# Patient Record
Sex: MALE
[De-identification: names, ages, dates, MRNs, and addresses within clinical notes are randomized per-mention and may not be internally consistent; named-entity substitution may affect disease eponyms.]

---

## 2017-10-30 ENCOUNTER — APPOINTMENT (OUTPATIENT)
Dept: OPHTHALMOLOGY | Facility: CLINIC | Age: 22
End: 2017-10-30
Payer: COMMERCIAL

## 2017-10-30 PROBLEM — Z00.00 ENCOUNTER FOR PREVENTIVE HEALTH EXAMINATION: Status: ACTIVE | Noted: 2017-10-30

## 2017-10-30 PROCEDURE — 92002 INTRM OPH EXAM NEW PATIENT: CPT

## 2017-10-30 RX ORDER — MOXIFLOXACIN OPHTHALMIC 5 MG/ML
0.5 SOLUTION/ DROPS OPHTHALMIC
Qty: 1 | Refills: 1 | Status: ACTIVE | COMMUNITY
Start: 2017-10-30 | End: 1900-01-01

## 2017-11-01 ENCOUNTER — APPOINTMENT (OUTPATIENT)
Dept: OPHTHALMOLOGY | Facility: CLINIC | Age: 22
End: 2017-11-01
Payer: COMMERCIAL

## 2017-11-01 DIAGNOSIS — H18.823 CORNEAL DISORDER DUE TO CONTACT LENS, BILATERAL: ICD-10-CM

## 2017-11-01 PROCEDURE — 92012 INTRM OPH EXAM EST PATIENT: CPT

## 2018-07-20 ENCOUNTER — HOSPITAL ENCOUNTER (EMERGENCY)
Dept: HOSPITAL 74 - JER | Age: 23
LOS: 1 days | Discharge: HOME | End: 2018-07-21
Payer: COMMERCIAL

## 2018-07-20 VITALS — BODY MASS INDEX: 25 KG/M2
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
## 2018-07-20 DIAGNOSIS — S01.112A: Primary | ICD-10-CM
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
## 2018-07-20 PROCEDURE — 0HQ1XZZ REPAIR FACE SKIN, EXTERNAL APPROACH: ICD-10-PCS

## 2018-07-20 NOTE — PDOC
History of Present Illness





- General


Stated Complaint: LACERATION


Time Seen by Provider: 07/20/18 23:41


History Source: Patient


Exam Limitations: No Limitations





- History of Present Illness


Initial Comments: 





07/21/18 00:11


23-year-old male presents to the ER complaining of a laceration to the left 

lateral eyebrow. Patient states while playing [place] soccer, he got elbowed in 

the left eyebrow. Bleeding controlled with direct pressure prior to his 

arrival. Patient denies headache, dizziness, lightheadedness, neck pain/

stiffness, back pains, visual disturbance, eye pain or diplopia. Patient denies 

any other complaints. Last tetanus 3 months ago.


07/21/18 00:15


Laceration repair by Dr. PATI Young





Past History





- Past Medical History


Allergies/Adverse Reactions: 


 Allergies











Allergy/AdvReac Type Severity Reaction Status Date / Time


 


No Known Allergies Allergy   Verified 07/21/18 00:09











Home Medications: 


Ambulatory Orders





NK [No Known Home Medication]  07/21/18 











**Review of Systems





- Review of Systems


Able to Perform ROS?: Yes


Comments:: 





07/20/18 23:42


CONSTITUTIONAL: 


Absent: fever, chills, diaphoresis, generalized weakness, malaise, loss of 

appetite


HEENT: 


+LEft lat eyebrow lac


Absent: rhinorrhea, nasal congestion, throat pain, throat swelling, difficulty 

swallowing, mouth swelling, ear pain, eye pain, visual Changes


CARDIOVASCULAR: 


Absent: chest pain, loss of consciousness, palpitations, irregular heart rate, 

peripheral edema


RESPIRATORY: 


Absent: cough, shortness of breath, dyspnea with exertion, orthopnea, wheezing, 

stridor, hemoptysis


GASTROINTESTINAL:


Absent: abdominal pain, abdominal distension, nausea, vomiting, diarrhea, 

constipation, melena, hematochezia


GENITOURINARY: 


Absent: dysuria, frequency, urgency, hesitancy, hematuria, flank pain, genital 

pain


MUSCULOSKELETAL: 


Absent: myalgia, arthralgia, joint swelling


SKIN: 


Absent: rash, itching, pallor


HEMATOLOGIC/IMMUNOLOGIC: 


Absent: easy bleeding, easy bruising, lymphadenopathy, frequent infections


ENDOCRINE:


Absent: unexplained weight gain, unexplained weight loss, heat intolerance, 

cold intolerance


NEUROLOGIC: 


Absent: headache, focal weakness or paresthesias, dizziness, unsteady gait, 

seizure, mental status changes, bladder or bowel incontinence





Is the patient limited English proficient: No





*Physical Exam





- Physical Exam


Comments: 





07/20/18 23:42


GENERAL:


Well developed, well nourished. Awake and alert. No acute distress.


HEENT:


+Left lat eyebrow lac 2cm transverse; partial thickness


Normocephalic, atraumatic. PERRLA, EOMI. No conjunctival pallor. Sclera are non-

icteric. Moist mucous membranes. Oropharynx is clear.


NECK: 


Supple. Full ROM. No JVD. Carotid pulses 2+ and symmetric, without bruits. No 

thyromegaly. No lymphadenopathy.


CARDIOVASCULAR:


Regular rate and rhythm. No murmurs, rubs, or gallops. Distal pulses are 2+ and 

symmetric. 


PULMONARY: 


No evidence of respiratory distress. Lungs clear to auscultation bilaterally. 

No wheezing, rales or rhonchi.


ABDOMINAL:


Soft. Non-tender. Non-distended. No rebound or guarding. No organomegaly. 

Normoactive bowel sounds. 


MUSCULOSKELETAL 


Normal range of motion at all joints. No bony deformities or tenderness. No CVA 

tenderness.


EXTREMITIES: 


No cyanosis. No clubbing. No edema. No calf tenderness.


SKIN: 


Warm and dry. Normal capillary refill. No rashes. No jaundice. 


NEUROLOGICAL: 


Alert, awake, appropriate. Cranial nerves 2-12 intact. No deficits to light 

touch and temperature in face, upper extremities and lower extremities. No 

motor deficits in the in face, upper extremities and lower extremities. 

Normoreflexic in the upper and lower extremities. Normal speech. Toes are down-

going bilaterally. Gait is normal without ataxia.








*DC/Admit/Observation/Transfer


Diagnosis at time of Disposition: 


Laceration of eyebrow


Qualifiers:


 Encounter type: initial encounter Laterality: left Qualified Code(s): S01.112A 

- Laceration without foreign body of left eyelid and periocular area, initial 

encounter








- Discharge Dispostion


Disposition: HOME


Condition at time of disposition: Stable


Decision to Admit order: No





- Referrals





- Patient Instructions


Printed Discharge Instructions:  DI for Laceration Repair


Additional Instructions: 


Keep the incision clean and dry for 24 hours.


After 24 hours, you may allow the soap and water to rinse off your incision.


Avoid direct pressure of the water to the incision.


Pat the incision dry with a clean clothe.


Apply a small amount of bacitracin onto the incision.


Cover the incision loosely with a bandaid.


Take tylenol/motrin as needed for pain.


Follow up with your physician or the ER in 48 hours for a wound check.


Return to the ER if you notice red streaks, increase redness/swelling/severe 

pain to the incision.





Suture removal in  6    days.





- Post Discharge Activity

## 2018-07-21 VITALS — DIASTOLIC BLOOD PRESSURE: 74 MMHG | HEART RATE: 88 BPM | TEMPERATURE: 97.6 F | SYSTOLIC BLOOD PRESSURE: 138 MMHG

## 2018-07-21 NOTE — PDOC
*Physical Exam





- Vital Signs


 Last Vital Signs











Temp Pulse Resp BP Pulse Ox


 


 97.6 F   88   20   138/74   99 


 


 07/21/18 00:11  07/21/18 00:11  07/21/18 00:11  07/21/18 00:11  07/21/18 00:11














- Physical Exam


General Appearance: Yes: Nourished, Appropriately Dressed


HEENT: positive: Normal Voice


Neck: positive: Trachea midline


Musculoskeletal: positive: Normal Inspection


Extremity: positive: Normal Inspection


Integumentary: positive: Normal Color, Dry, Warm, Other (2.5 cm laceration to 

left eyebrow. superficial. )


Neurologic: positive: Fully Oriented, Alert





*DC/Admit/Observation/Transfer


Diagnosis at time of Disposition: 


Laceration of eyebrow


Qualifiers:


 Encounter type: initial encounter Laterality: left Qualified Code(s): S01.112A 

- Laceration without foreign body of left eyelid and periocular area, initial 

encounter








- Discharge Dispostion


Disposition: HOME


Condition at time of disposition: Stable





- Referrals





- Patient Instructions


Printed Discharge Instructions:  DI for Laceration Repair


Additional Instructions: 


Keep the incision clean and dry for 24 hours.


After 24 hours, you may allow the soap and water to rinse off your incision.


Avoid direct pressure of the water to the incision.


Pat the incision dry with a clean clothe.


Apply a small amount of bacitracin onto the incision.


Cover the incision loosely with a bandaid.


Take tylenol/motrin as needed for pain.


Follow up with your physician or the ER in 48 hours for a wound check.


Return to the ER if you notice red streaks, increase redness/swelling/severe 

pain to the incision.





Suture removal in  6    days.





- Post Discharge Activity





Procedure Note


Procedure: 





Placed 5 sutures of 5-0 nylon in the left eyebrow. 2.5 cm superficial simple 

laceration with clean edges. Washed thoroughly with sterile water and preformed 

sterile technique in applying suture. Used 1% lidocaine, approximately 1.5 mL 

for appropriate analgesia. No complications throughout the procedure. neosporin 

antibiotic ointment applied at the end. Stated to patient to follow up with PCP 

for suture removal. .

## 2018-07-21 NOTE — PDOC
*Physical Exam





- Physical Exam


Comments: 





07/21/18 00:05


The patient was examined by [ADARSH Chaudhari] under my direct supervision. I personally 

evaluated the patient. I concur with the above findings and the plan of care.